# Patient Record
Sex: FEMALE | URBAN - METROPOLITAN AREA
[De-identification: names, ages, dates, MRNs, and addresses within clinical notes are randomized per-mention and may not be internally consistent; named-entity substitution may affect disease eponyms.]

---

## 2022-02-09 ENCOUNTER — WEB ENCOUNTER (OUTPATIENT)
Dept: URBAN - METROPOLITAN AREA CLINIC 98 | Facility: CLINIC | Age: 42
End: 2022-02-09

## 2022-02-09 ENCOUNTER — OFFICE VISIT (OUTPATIENT)
Dept: URBAN - METROPOLITAN AREA CLINIC 98 | Facility: CLINIC | Age: 42
End: 2022-02-09
Payer: COMMERCIAL

## 2022-02-09 VITALS
DIASTOLIC BLOOD PRESSURE: 66 MMHG | TEMPERATURE: 96.8 F | HEART RATE: 64 BPM | SYSTOLIC BLOOD PRESSURE: 104 MMHG | HEIGHT: 68 IN | WEIGHT: 141.2 LBS

## 2022-02-09 DIAGNOSIS — R10.13 EPIGASTRIC PAIN: ICD-10-CM

## 2022-02-09 DIAGNOSIS — R10.11 RUQ ABDOMINAL PAIN: ICD-10-CM

## 2022-02-09 DIAGNOSIS — R10.32 LLQ ABDOMINAL PAIN: ICD-10-CM

## 2022-02-09 PROCEDURE — 99243 OFF/OP CNSLTJ NEW/EST LOW 30: CPT | Performed by: INTERNAL MEDICINE

## 2022-02-09 PROCEDURE — 99203 OFFICE O/P NEW LOW 30 MIN: CPT | Performed by: INTERNAL MEDICINE

## 2022-02-09 RX ORDER — TRANEXAMIC ACID 650 MG/1
AS DIRECTED TABLET, FILM COATED ORAL
Status: ACTIVE | COMMUNITY

## 2022-02-09 RX ORDER — OMEPRAZOLE 20 MG/1
1 CAPSULE 30 MINUTES BEFORE MORNING MEAL CAPSULE, DELAYED RELEASE ORAL TWICE A DAY
Status: ACTIVE | COMMUNITY

## 2022-02-09 NOTE — HPI-TODAY'S VISIT:
Patient referred by Juan Antonio Castillo MD for evaluation of  abdominal pain. Copy of this consult OV sent to Dr. Castillo.  40 yo pt w epigastric pain wo radiation wo N/V, seen 6 mos ago @ [facility] in [place]: normal US, no labs obtained and Rx'd w Omeprazole 40 mg/d. Sxs improved, but left w episodic sharp epigastric pain w radiation to the back w some heartburn. No N/V / dysphagia /odynophagia and no anorexia or weight loss. On 12/21 seen @ [facility] for menometrorrhagia: anemia and fibroids, no surgery. Has normal bm's wo melenic stools nor hematochezia. Had COVID-19 2020 and has received 2 doses of CIVID-19 vaccine. No other complaints.

## 2022-02-09 NOTE — PHYSICAL EXAM GASTROINTESTINAL
Abdomen , soft, RUQ tenderness w gurading, tympanitic, w LLQ discomfort w palpation,  nondistended , no guarding or rigidity , no masses palpable , normal bowel sounds , Liver and Spleen , no hepatomegaly present , no hepatosplenomegaly , liver nontender , spleen not palpable

## 2022-02-10 LAB
A/G RATIO: 1.6
ALBUMIN: 4.6
ALKALINE PHOSPHATASE: 61
ALT (SGPT): 10
AST (SGOT): 15
BILIRUBIN, TOTAL: 0.2
BUN/CREATININE RATIO: 11
BUN: 7
CALCIUM: 9.5
CARBON DIOXIDE, TOTAL: 22
CHLORIDE: 102
CREATININE: 0.63
EGFR IF AFRICN AM: 129
EGFR IF NONAFRICN AM: 112
GLOBULIN, TOTAL: 2.8
GLUCOSE: 82
HEMATOCRIT: 38.7
HEMOGLOBIN: 12.3
LIPASE: 27
MCH: 28.3
MCHC: 31.8
MCV: 89
NRBC: (no result)
PLATELETS: 254
POTASSIUM: 4.2
PROTEIN, TOTAL: 7.4
RBC: 4.35
RDW: 13.5
SODIUM: 137
WBC: 5.5

## 2022-02-28 ENCOUNTER — OFFICE VISIT (OUTPATIENT)
Dept: URBAN - METROPOLITAN AREA CLINIC 95 | Facility: CLINIC | Age: 42
End: 2022-02-28
Payer: COMMERCIAL

## 2022-02-28 DIAGNOSIS — R10.11 RUQ ABDOMINAL PAIN: ICD-10-CM

## 2022-02-28 PROCEDURE — 76705 ECHO EXAM OF ABDOMEN: CPT | Performed by: INTERNAL MEDICINE

## 2022-03-25 ENCOUNTER — OFFICE VISIT (OUTPATIENT)
Dept: URBAN - METROPOLITAN AREA SURGERY CENTER 18 | Facility: SURGERY CENTER | Age: 42
End: 2022-03-25
Payer: COMMERCIAL

## 2022-03-25 DIAGNOSIS — K22.89 OTHER SPECIFIED DISEASE OF ESOPHAGUS: ICD-10-CM

## 2022-03-25 DIAGNOSIS — K29.30 CHRONIC SUPERFICIAL GASTRITIS: ICD-10-CM

## 2022-03-25 PROCEDURE — 43239 EGD BIOPSY SINGLE/MULTIPLE: CPT | Performed by: INTERNAL MEDICINE

## 2022-03-25 PROCEDURE — G8907 PT DOC NO EVENTS ON DISCHARG: HCPCS | Performed by: INTERNAL MEDICINE

## 2022-03-25 RX ORDER — TRANEXAMIC ACID 650 MG/1
AS DIRECTED TABLET, FILM COATED ORAL
Status: ACTIVE | COMMUNITY

## 2022-03-25 RX ORDER — OMEPRAZOLE 20 MG/1
1 CAPSULE 30 MINUTES BEFORE MORNING MEAL CAPSULE, DELAYED RELEASE ORAL TWICE A DAY
Status: ACTIVE | COMMUNITY

## 2022-03-26 ENCOUNTER — TELEPHONE ENCOUNTER (OUTPATIENT)
Dept: URBAN - METROPOLITAN AREA CLINIC 92 | Facility: CLINIC | Age: 42
End: 2022-03-26

## 2022-03-26 RX ORDER — SUCRALFATE 1 G/10ML
10 ML ON AN EMPTY STOMACH SUSPENSION ORAL THREE TIMES A DAY
Qty: 300 ML | Refills: 3 | OUTPATIENT
Start: 2022-03-26 | End: 2022-05-05

## 2022-03-30 ENCOUNTER — WEB ENCOUNTER (OUTPATIENT)
Dept: URBAN - METROPOLITAN AREA CLINIC 98 | Facility: CLINIC | Age: 42
End: 2022-03-30

## 2022-03-30 ENCOUNTER — OFFICE VISIT (OUTPATIENT)
Dept: URBAN - METROPOLITAN AREA CLINIC 98 | Facility: CLINIC | Age: 42
End: 2022-03-30
Payer: COMMERCIAL

## 2022-03-30 VITALS
HEIGHT: 68 IN | DIASTOLIC BLOOD PRESSURE: 64 MMHG | SYSTOLIC BLOOD PRESSURE: 104 MMHG | WEIGHT: 141 LBS | HEART RATE: 74 BPM | TEMPERATURE: 96.8 F | BODY MASS INDEX: 21.37 KG/M2

## 2022-03-30 DIAGNOSIS — R10.13 EPIGASTRIC PAIN: ICD-10-CM

## 2022-03-30 DIAGNOSIS — K21.9 NONEROSIVE ESOPHAGEAL REFLUX DISEASE: ICD-10-CM

## 2022-03-30 DIAGNOSIS — G43.009 MIGRAINE WITHOUT AURA AND WITHOUT STATUS MIGRAINOSUS, NOT INTRACTABLE: ICD-10-CM

## 2022-03-30 PROCEDURE — 99214 OFFICE O/P EST MOD 30 MIN: CPT | Performed by: INTERNAL MEDICINE

## 2022-03-30 RX ORDER — SUCRALFATE 1 G/10ML
10 ML ON AN EMPTY STOMACH SUSPENSION ORAL THREE TIMES A DAY
Qty: 300 ML | Refills: 3 | Status: ACTIVE | COMMUNITY
Start: 2022-03-26 | End: 2022-05-05

## 2022-03-30 RX ORDER — TRANEXAMIC ACID 650 MG/1
AS DIRECTED TABLET, FILM COATED ORAL
Status: ACTIVE | COMMUNITY

## 2022-03-30 RX ORDER — OMEPRAZOLE 20 MG/1
1 CAPSULE 30 MINUTES BEFORE MORNING MEAL CAPSULE, DELAYED RELEASE ORAL TWICE A DAY
Status: ACTIVE | COMMUNITY

## 2022-03-30 NOTE — HPI-TODAY'S VISIT:
42 yo pt for epigastric pain follow up. She still has rather constantp-prandial  epigastric pain, unrelated to the type of food she eats, w nausea, occasional bilioud emesis wo hematemesis and no radiation.  She also reports persistent migraines-HA ( sharp L-temporal area ) partlally responsive to Sumatriptan.  No dysphagia /odynophagia and no anorexia or weight loss. EGD 3/22: NERD, sm HH and mild Hp-negative gastritis w normal duodenal bx's. She had a normal Brain CT on 12/21. On 12/21 seen @ [facility] for menometrorrhagia: anemia and fibroids, no surgery. Has normal bm's wo melenic stools nor hematochezia. Had COVID-19 2020 and has received 2 doses of CIVID-19 vaccine. No other complaints.

## 2022-04-15 ENCOUNTER — TELEPHONE ENCOUNTER (OUTPATIENT)
Dept: URBAN - METROPOLITAN AREA CLINIC 98 | Facility: CLINIC | Age: 42
End: 2022-04-15

## 2022-05-07 ENCOUNTER — OFFICE VISIT (OUTPATIENT)
Dept: URBAN - METROPOLITAN AREA TELEHEALTH 2 | Facility: TELEHEALTH | Age: 42
End: 2022-05-07

## 2022-06-11 ENCOUNTER — OFFICE VISIT (OUTPATIENT)
Dept: URBAN - METROPOLITAN AREA TELEHEALTH 2 | Facility: TELEHEALTH | Age: 42
End: 2022-06-11
Payer: COMMERCIAL

## 2022-06-11 DIAGNOSIS — G43.009 MIGRAINE WITHOUT AURA AND WITHOUT STATUS MIGRAINOSUS, NOT INTRACTABLE: ICD-10-CM

## 2022-06-11 DIAGNOSIS — K21.9 NONEROSIVE ESOPHAGEAL REFLUX DISEASE: ICD-10-CM

## 2022-06-11 DIAGNOSIS — R10.13 EPIGASTRIC PAIN: ICD-10-CM

## 2022-06-11 PROCEDURE — 99214 OFFICE O/P EST MOD 30 MIN: CPT | Performed by: INTERNAL MEDICINE

## 2022-06-11 RX ORDER — OMEPRAZOLE 40 MG/1
1 CAPSULE 30 MINUTES BEFORE MORNING MEAL CAPSULE, DELAYED RELEASE ORAL ONCE A DAY
Qty: 30 | Refills: 3 | OUTPATIENT
Start: 2022-06-14

## 2022-06-11 RX ORDER — TRANEXAMIC ACID 650 MG/1
AS DIRECTED TABLET, FILM COATED ORAL
Status: ACTIVE | COMMUNITY

## 2022-06-11 RX ORDER — OMEPRAZOLE 20 MG/1
1 CAPSULE 30 MINUTES BEFORE MORNING MEAL CAPSULE, DELAYED RELEASE ORAL TWICE A DAY
Status: ACTIVE | COMMUNITY

## 2022-06-11 NOTE — HPI-TODAY'S VISIT:
This is a telehealth OV to which patient has agreed to. Limitations of telehealth discussed; she understands and agrees to proceed. Patient's @ home during this virtual  OV. 42 yo pt for epigastric pain follow up. She still has rather constant , but intermittent p-prandial  epigastric pain, unrelated to the type of food she eats, w nausea, occasional bilious emesis wo hematemesis and no radiation.   Recent A + P CT: liver cyst, fibroids and constipation  wo acute findings. She also reports persistent migraines-HA ( sharp L-temporal area ) partlally responsive to Sumatriptan.  No dysphagia /odynophagia and no anorexia or weight loss. EGD 3/22: NERD, sm HH and mild Hp-negative gastritis w normal duodenal bx's. She had a normal Brain CT on 12/21. On 12/21 seen @ [place] for menometrorrhagia: anemia and fibroids, no surgery. Has normal bm's wo melenic stools nor hematochezia. Had COVID-19 2020 and has received 2 doses of CIVID-19 vaccine. No other complaints.

## 2022-06-11 NOTE — PHYSICAL EXAM CHEST:
breathing is unlabored without accessory muscle use. <-------- Click here to INCLUDE CoVID-19 Discharge Instructions

## 2022-06-14 ENCOUNTER — DASHBOARD ENCOUNTERS (OUTPATIENT)
Age: 42
End: 2022-06-14

## 2022-06-14 PROBLEM — 425007008: Status: ACTIVE | Noted: 2022-04-01

## 2022-06-14 PROBLEM — 235595009: Status: ACTIVE | Noted: 2022-04-01

## 2022-09-15 ENCOUNTER — TELEPHONE ENCOUNTER (OUTPATIENT)
Dept: URBAN - METROPOLITAN AREA CLINIC 98 | Facility: CLINIC | Age: 42
End: 2022-09-15

## 2022-09-15 RX ORDER — OMEPRAZOLE 40 MG/1
1 CAPSULE 30 MINUTES BEFORE MORNING MEAL CAPSULE, DELAYED RELEASE ORAL ONCE A DAY
Qty: 30 | Refills: 3
Start: 2022-06-14

## 2023-02-13 ENCOUNTER — ERX REFILL RESPONSE (OUTPATIENT)
Dept: URBAN - METROPOLITAN AREA CLINIC 98 | Facility: CLINIC | Age: 43
End: 2023-02-13

## 2023-02-13 RX ORDER — OMEPRAZOLE 40 MG/1
TAKE ONE CAPSULE BY MOUTH DAILY 30 MINUTES BEFORE MORNING MEAL CAPSULE, DELAYED RELEASE ORAL
Qty: 30 CAPSULE | Refills: 0 | OUTPATIENT

## 2023-02-13 RX ORDER — OMEPRAZOLE 40 MG/1
1 CAPSULE 30 MINUTES BEFORE MORNING MEAL CAPSULE, DELAYED RELEASE ORAL ONCE A DAY
Qty: 30 | Refills: 3 | OUTPATIENT